# Patient Record
Sex: FEMALE | Race: WHITE | NOT HISPANIC OR LATINO
[De-identification: names, ages, dates, MRNs, and addresses within clinical notes are randomized per-mention and may not be internally consistent; named-entity substitution may affect disease eponyms.]

---

## 2017-06-12 ENCOUNTER — APPOINTMENT (OUTPATIENT)
Dept: ORTHOPEDIC SURGERY | Facility: CLINIC | Age: 57
End: 2017-06-12

## 2017-06-12 VITALS — BODY MASS INDEX: 37.89 KG/M2 | HEIGHT: 68 IN | WEIGHT: 250 LBS

## 2017-06-14 ENCOUNTER — MEDICATION RENEWAL (OUTPATIENT)
Age: 57
End: 2017-06-14

## 2017-07-21 ENCOUNTER — APPOINTMENT (OUTPATIENT)
Dept: NEUROLOGY | Facility: CLINIC | Age: 57
End: 2017-07-21

## 2017-07-21 VITALS
WEIGHT: 257 LBS | HEIGHT: 68 IN | HEART RATE: 150 BPM | SYSTOLIC BLOOD PRESSURE: 125 MMHG | BODY MASS INDEX: 38.95 KG/M2 | DIASTOLIC BLOOD PRESSURE: 88 MMHG

## 2017-07-21 DIAGNOSIS — M54.5 LOW BACK PAIN: ICD-10-CM

## 2017-07-21 RX ORDER — DICLOFENAC SODIUM 75 MG/1
75 TABLET, DELAYED RELEASE ORAL
Qty: 90 | Refills: 1 | Status: DISCONTINUED | COMMUNITY
Start: 2017-06-12 | End: 2017-07-21

## 2017-08-16 ENCOUNTER — RX RENEWAL (OUTPATIENT)
Age: 57
End: 2017-08-16

## 2017-08-16 RX ORDER — TIZANIDINE 4 MG/1
4 TABLET ORAL 3 TIMES DAILY
Qty: 90 | Refills: 1 | Status: ACTIVE | COMMUNITY
Start: 2017-06-15 | End: 1900-01-01

## 2017-10-27 ENCOUNTER — APPOINTMENT (OUTPATIENT)
Dept: NEUROLOGY | Facility: CLINIC | Age: 57
End: 2017-10-27

## 2019-08-23 ENCOUNTER — APPOINTMENT (OUTPATIENT)
Dept: ORTHOPEDIC SURGERY | Facility: CLINIC | Age: 59
End: 2019-08-23
Payer: COMMERCIAL

## 2019-08-23 DIAGNOSIS — M70.71 OTHER BURSITIS OF HIP, RIGHT HIP: ICD-10-CM

## 2019-08-23 PROCEDURE — 73522 X-RAY EXAM HIPS BI 3-4 VIEWS: CPT

## 2019-08-23 PROCEDURE — 99214 OFFICE O/P EST MOD 30 MIN: CPT | Mod: 25

## 2019-08-23 PROCEDURE — 20610 DRAIN/INJ JOINT/BURSA W/O US: CPT | Mod: RT

## 2019-08-23 NOTE — DISCUSSION/SUMMARY
[de-identified] : Following the injection the patient had excellent improvement and almost no pain over her greater trochanter she will take 2 Aleve twice a day and possibly after Zantac.  Return visit in 3 months as needed.

## 2019-08-23 NOTE — PROCEDURE
[de-identified] : Procedure Note: \par \par Anatomic Location:  right hip trochanteric bursitis\par \par Diagnosis:  hip trochanteric bursitis\par \par Procedure:  Injection of 6ccs of Marcaine 0.5% plain and Depo-Medrol 1cc (40 MG)\par \par Local Spray: Ethyl Chloride.\par \par Skin preparation with alcohol.\par \par Patient has consented for the procedure.\par \par Injection 22-gauge spinal needle  through an anterior  lateral approach.\par \par Patient tolerated the procedure well.\par \par Patient instructed to call the office if any reaction, fever, chills, increased erythema or swelling.   [phone].

## 2019-08-23 NOTE — PHYSICAL EXAM
[de-identified] :  constitutional - the patient is of normal build and morbid obesity.  BMI is 39.  The patient remains oriented to person, time, and  place.  Mood is normal. Vital signs as recorded.  The patients gait is WNL with pain over the lateral aspect of her right hip.. The patient has satisfactory  balance and can stand on toes and heels.\par \par The patient has no difficulty with respiration. Respiration at rest is a normal rate. The patient is not short of breath and has not become short of breath with short ambulation. There is no audible wheezing. No coughing.\par \par Skin is normal for the patient's age. There are no abnormal masses or lymph nodes which stand out in the lower extremities.\par \par Spine -as per Dr. Seth\par \par \par UPPER EXTREMITIES \par \par Shoulders ROM  is symmetric  and the motion is satisfactory.  There is no significant shoulder pain or limitation in motion which would make using a cane or a walker difficult. Shoulder stability and  strength are satisfactory.\par \par Circulation appears satisfactory with pedal pulses present.  There is no major edema in the lower legs. No skin tenderness or increased temperature. No major varicosities.\par \par HIP EXAMINATION the abduction and abduction as well as rotation measurements were taken with the hip in flexion.\par Hip motion is symmetric with flexion of 135, abduction of 80, adduction 40, external rotation 75 and internal rotation to 20.  She has full extension.  He has pain directly over her right greater trochanter.  Has had a lipoma removed around her right ileum proximal to the hip.\par \par \par The hips have good range of motion. There is good strength across the hips. There is no crepitus in either hip. The alignment of the hips is normal.\par \par \par KNEE EXAMINATION\par \par Motion\par Right Knee                 *[0-135]\par Left  Knee                  *[0-135]\par The Knees have very good motion. There is good medial lateral and anterior posterior stability. There is no crepitus. There is no effusion. There is good strength across the knees.\par \par Ankle and foot examination\par Of the ankle has normal motion.  There is normal ankle stability.  The patient has no major abnormalities of the foot.\par \par \par \par  [de-identified] : AP of the pelvis and lateral of the right and left hip shows both femoral heads are round joint spaces are present there are no major osteophytes seen.  She is noted to have a significant list of the lumbar spine going to the left but an old x-rays she is a curvature in the lumbar area of the convexity to the left.

## 2019-12-20 ENCOUNTER — APPOINTMENT (OUTPATIENT)
Dept: ORTHOPEDIC SURGERY | Facility: CLINIC | Age: 59
End: 2019-12-20

## 2020-03-20 RX ORDER — CELECOXIB 100 MG/1
100 CAPSULE ORAL TWICE DAILY
Qty: 60 | Refills: 1 | Status: ACTIVE | COMMUNITY
Start: 2020-03-20 | End: 1900-01-01

## 2020-03-27 ENCOUNTER — APPOINTMENT (OUTPATIENT)
Dept: ORTHOPEDIC SURGERY | Facility: CLINIC | Age: 60
End: 2020-03-27

## 2020-05-27 ENCOUNTER — APPOINTMENT (OUTPATIENT)
Dept: ORTHOPEDIC SURGERY | Facility: CLINIC | Age: 60
End: 2020-05-27
Payer: COMMERCIAL

## 2020-05-27 DIAGNOSIS — Z96.652 PRESENCE OF LEFT ARTIFICIAL KNEE JOINT: ICD-10-CM

## 2020-05-27 DIAGNOSIS — Z96.651 PRESENCE OF RIGHT ARTIFICIAL KNEE JOINT: ICD-10-CM

## 2020-05-27 PROCEDURE — 20610 DRAIN/INJ JOINT/BURSA W/O US: CPT | Mod: RT

## 2020-05-27 PROCEDURE — 99213 OFFICE O/P EST LOW 20 MIN: CPT | Mod: 25

## 2020-05-27 NOTE — PROCEDURE
[de-identified] : Procedure Note: \par \par Anatomic Location:  right hip trochanteric bursitis\par \par Diagnosis:  hip trochanteric bursitis\par \par Procedure:  Injection of 6ccs of Marcaine 0.5% plain and Depo-Medrol 1cc (40 MG)\par \par Local Spray: Ethyl Chloride.\par \par Skin preparation with alcohol.\par \par Patient has consented for the procedure.\par \par Injection 22-gauge spinal needle  through a direct lateral l approach.\par \par Patient tolerated the procedure well.\par \par \par Procedure Note: \par \par Anatomic Location: Left hip trochanteric bursitis\par \par Diagnosis:  hip trochanteric bursitis\par \par Procedure:  Injection of 6ccs of Marcaine 0.5% plain and Depo-Medrol 1cc (40 MG)\par \par Local Spray: Ethyl Chloride.\par \par Skin preparation with alcohol.\par \par Patient has consented for the procedure.\par \par Injection 22-gauge spinal needle  through an direct lateral approach.\par \par Patient tolerated the procedure well.\par \par Patient instructed to call the office if any reaction, fever, chills, increased erythema or swelling.   [phone].

## 2020-05-27 NOTE — PHYSICAL EXAM
[de-identified] : This patient does continue to be overweight she is doing generally well however it is having some pain over the greater trochanter on both the right and the left hips.\par \par HIP EXAMINATION the abduction and abduction as well as rotation measurements were taken with the hip in flexion.\par Hip motion is symmetric with flexion of 135, abduction of 80, adduction 40, external rotation 75 and internal rotation to 20.  She has full extension.  He has pain directly over her right greater trochanter.  Her tenderness is directly over the greater trochanters.\par \par \par There is no crepitus in either hip. The alignment of the hips is normal.\par \par \par KNEE EXAMINATION\par \par Motion\par Right Knee                 *[0-135]\par Left  Knee                  *[0-135]\par The Knees have very good motion. There is good medial lateral and anterior posterior stability. There is no crepitus. There is no effusion. There is good strength across the knees.\par \par Ankle and foot examination\par Of the ankle has normal motion.  There is normal ankle stability.  The patient has no major abnormalities of the foot.\par \par \par \par  [de-identified] : AP of the pelvis and lateral of the right and left hip shows both femoral heads are round joint spaces are present there are no major osteophytes seen.  She is noted to have a significant list of the lumbar spine going to the left but an old x-rays she is a curvature in the lumbar area of the convexity to the left.

## 2020-05-27 NOTE — HISTORY OF PRESENT ILLNESS
[de-identified] : Patient returns to office with bilateral hip pain.\par She was last in office in August 2019 and received a right GT bursa injection then, which helped greatly, but for only 1 month.\par She tried Ibuprofen, but doesn't note any improvement with this.

## 2020-06-26 ENCOUNTER — RESULT REVIEW (OUTPATIENT)
Age: 60
End: 2020-06-26

## 2020-08-25 ENCOUNTER — APPOINTMENT (OUTPATIENT)
Dept: ORTHOPEDIC SURGERY | Facility: CLINIC | Age: 60
End: 2020-08-25

## 2020-08-28 ENCOUNTER — APPOINTMENT (OUTPATIENT)
Dept: ORTHOPEDIC SURGERY | Facility: CLINIC | Age: 60
End: 2020-08-28
Payer: COMMERCIAL

## 2020-08-28 VITALS
SYSTOLIC BLOOD PRESSURE: 135 MMHG | HEIGHT: 68 IN | HEART RATE: 80 BPM | BODY MASS INDEX: 37.89 KG/M2 | DIASTOLIC BLOOD PRESSURE: 93 MMHG | WEIGHT: 250 LBS

## 2020-08-28 VITALS — TEMPERATURE: 97.3 F

## 2020-08-28 DIAGNOSIS — M54.16 RADICULOPATHY, LUMBAR REGION: ICD-10-CM

## 2020-08-28 DIAGNOSIS — M70.62 TROCHANTERIC BURSITIS, RIGHT HIP: ICD-10-CM

## 2020-08-28 DIAGNOSIS — M70.61 TROCHANTERIC BURSITIS, RIGHT HIP: ICD-10-CM

## 2020-08-28 PROCEDURE — 20610 DRAIN/INJ JOINT/BURSA W/O US: CPT | Mod: RT

## 2020-08-28 PROCEDURE — 99215 OFFICE O/P EST HI 40 MIN: CPT | Mod: 25

## 2020-08-28 NOTE — PHYSICAL EXAM
[de-identified] : AP of the pelvis and lateral of the right and left hip reviewed from 2019 shows both femoral heads are round joint spaces are present there are no major osteophytes seen.  \par \par AP and lateral views of the lumbar spine from 2015 show a significant list of the lumbar spine going to left along with degenerative disc spaces between the L2-3 and L3-4 vertebral bodies with anterolisthesis of L3 on L4. [de-identified] : This patient does continue to be overweight she is doing generally well however it is having some pain over the greater trochanter on both the right and the left hips.\par \par HIP EXAMINATION the abduction and abduction as well as rotation measurements were taken with the hip in flexion.\par Hip motion is symmetric with flexion of 135, abduction of 80, adduction 40, external rotation 75 and internal rotation to 20.  She has full extension. She has pain directly over her right greater trochanter.  Her tenderness is directly over the greater trochanters.\par \par There is no crepitus in either hip. The alignment of the hips is normal.\par \par KNEE EXAMINATION\par \par Motion\par Right Knee                 *[0-135]\par Left  Knee                  *[0-135]\par The Knees have very good motion. There is good medial lateral and anterior posterior stability. There is no crepitus. There is no effusion. There is good strength across the knees.\par \par Ankle and foot examination\par Of the ankle has normal motion.  There is normal ankle stability.  The patient has no major abnormalities of the foot.\par \par LUMBAR SPINE EXAMINATION:\par \par Inspection reveals no major deformities but she does appear to have levoscoliosis. Palpation produces pain to the left greater than right sacroiliac joints with pain also present over the L2, L3, and L4 vertebral bodies. Lumbar flexion, extension, lateral rotation, and lateral bending are within normal limits. 5/5 strength in bilateral lower extremities and normal sensation and neurovascular exams bilaterally.

## 2020-08-28 NOTE — DISCUSSION/SUMMARY
[de-identified] : She tolerated the local injections very well to both of her hips at the greater trochanters. She will return to office in 3 to 6 months for this.\par Due to her pain present today in her lumbar spine and likely radicular nature of her pain, an MRI of her lumbar spine was ordered for further evaluation.\par She will inform the office once her MRI is completed and we will discuss the results with her over the phone.\par Pending results of the MRI, she will likely be referred to spine orthopedics for further evaluation and possible referral to physiatry for epidural injections will be needed.\par All options discussed with patient.\par All questions by patient answered to their satisfaction.\par Patient expresses full understanding and agreement with plan.\par Patient is very happy with the office visit.\par

## 2020-08-28 NOTE — HISTORY OF PRESENT ILLNESS
[de-identified] : Patient returns to office with bilateral hip pain.\par She was last in office in May 2020 and received bilateral greater trochanteric bursa injections then, which helped greatly for about 2-3 months.\par She tried Ibuprofen, but doesn't note any improvement with this.\par She also notes some intermittent lower back pain which she says radiates into her hips and sometimes down her legs into her thighs.\par She denies bowel/bladder dysfunction or saddle anesthesia.

## 2020-08-28 NOTE — PROCEDURE
[de-identified] : Procedure Note: \par \par Anatomic Location:  right hip trochanteric bursitis\par \par Diagnosis:  hip trochanteric bursitis\par \par Procedure:  Injection of 6ccs of Marcaine 0.5% plain and Depo-Medrol 1cc (40 MG)\par \par Local Spray: Ethyl Chloride.\par \par Skin preparation with alcohol.\par \par Patient has consented for the procedure.\par \par Injection 22-gauge spinal needle  through a direct lateral l approach.\par \par Patient tolerated the procedure well.\par \par \par Procedure Note: \par \par Anatomic Location: Left hip trochanteric bursitis\par \par Diagnosis:  hip trochanteric bursitis\par \par Procedure:  Injection of 6ccs of Marcaine 0.5% plain and Depo-Medrol 1cc (40 MG)\par \par Local Spray: Ethyl Chloride.\par \par Skin preparation with alcohol.\par \par Patient has consented for the procedure.\par \par Injection 22-gauge spinal needle  through an direct lateral approach.\par \par Patient tolerated the procedure well.\par \par Patient instructed to call the office if any reaction, fever, chills, increased erythema or swelling.   [phone].

## 2020-10-04 ENCOUNTER — RESULT REVIEW (OUTPATIENT)
Age: 60
End: 2020-10-04

## 2020-10-04 ENCOUNTER — APPOINTMENT (OUTPATIENT)
Dept: MRI IMAGING | Facility: CLINIC | Age: 60
End: 2020-10-04
Payer: COMMERCIAL

## 2020-10-04 ENCOUNTER — OUTPATIENT (OUTPATIENT)
Dept: OUTPATIENT SERVICES | Facility: HOSPITAL | Age: 60
LOS: 1 days | End: 2020-10-04
Payer: COMMERCIAL

## 2020-10-04 DIAGNOSIS — M54.5 LOW BACK PAIN: ICD-10-CM

## 2020-10-04 DIAGNOSIS — M54.16 RADICULOPATHY, LUMBAR REGION: ICD-10-CM

## 2020-10-04 PROCEDURE — 72148 MRI LUMBAR SPINE W/O DYE: CPT | Mod: 26

## 2020-10-04 PROCEDURE — 72148 MRI LUMBAR SPINE W/O DYE: CPT

## 2020-10-21 ENCOUNTER — APPOINTMENT (OUTPATIENT)
Dept: ORTHOPEDIC SURGERY | Facility: CLINIC | Age: 60
End: 2020-10-21

## 2020-10-30 ENCOUNTER — INPATIENT (INPATIENT)
Facility: HOSPITAL | Age: 60
LOS: 0 days | Discharge: HOME | DRG: 310 | End: 2020-10-31
Attending: HOSPITALIST | Admitting: HOSPITALIST
Payer: COMMERCIAL

## 2020-10-30 VITALS
RESPIRATION RATE: 18 BRPM | DIASTOLIC BLOOD PRESSURE: 83 MMHG | TEMPERATURE: 98 F | HEART RATE: 115 BPM | OXYGEN SATURATION: 99 % | HEIGHT: 68 IN | SYSTOLIC BLOOD PRESSURE: 135 MMHG | WEIGHT: 250 LBS

## 2020-10-30 DIAGNOSIS — Z98.890 OTHER SPECIFIED POSTPROCEDURAL STATES: Chronic | ICD-10-CM

## 2020-10-30 DIAGNOSIS — M54.9 DORSALGIA, UNSPECIFIED: ICD-10-CM

## 2020-10-30 DIAGNOSIS — I48.91 UNSPECIFIED ATRIAL FIBRILLATION: ICD-10-CM

## 2020-10-30 DIAGNOSIS — Z29.9 ENCOUNTER FOR PROPHYLACTIC MEASURES, UNSPECIFIED: ICD-10-CM

## 2020-10-30 DIAGNOSIS — E03.9 HYPOTHYROIDISM, UNSPECIFIED: ICD-10-CM

## 2020-10-30 DIAGNOSIS — E66.01 MORBID (SEVERE) OBESITY DUE TO EXCESS CALORIES: ICD-10-CM

## 2020-10-30 LAB
ALBUMIN SERPL ELPH-MCNC: 4.7 G/DL — SIGNIFICANT CHANGE UP (ref 3.3–5)
ALP SERPL-CCNC: 77 U/L — SIGNIFICANT CHANGE UP (ref 40–120)
ALT FLD-CCNC: <5 U/L — LOW (ref 10–45)
ANION GAP SERPL CALC-SCNC: 15 MMOL/L — SIGNIFICANT CHANGE UP (ref 5–17)
APPEARANCE UR: CLEAR — SIGNIFICANT CHANGE UP
APTT BLD: 27.3 SEC — LOW (ref 27.5–35.5)
AST SERPL-CCNC: 49 U/L — HIGH (ref 10–40)
BASOPHILS # BLD AUTO: 0.07 K/UL — SIGNIFICANT CHANGE UP (ref 0–0.2)
BASOPHILS NFR BLD AUTO: 0.7 % — SIGNIFICANT CHANGE UP (ref 0–2)
BILIRUB SERPL-MCNC: 0.3 MG/DL — SIGNIFICANT CHANGE UP (ref 0.2–1.2)
BILIRUB UR-MCNC: NEGATIVE — SIGNIFICANT CHANGE UP
BUN SERPL-MCNC: 20 MG/DL — SIGNIFICANT CHANGE UP (ref 7–23)
CALCIUM SERPL-MCNC: 10.3 MG/DL — SIGNIFICANT CHANGE UP (ref 8.4–10.5)
CHLORIDE SERPL-SCNC: 104 MMOL/L — SIGNIFICANT CHANGE UP (ref 96–108)
CO2 SERPL-SCNC: 21 MMOL/L — LOW (ref 22–31)
COLOR SPEC: COLORLESS — SIGNIFICANT CHANGE UP
CREAT SERPL-MCNC: 0.81 MG/DL — SIGNIFICANT CHANGE UP (ref 0.5–1.3)
DIFF PNL FLD: NEGATIVE — SIGNIFICANT CHANGE UP
EOSINOPHIL # BLD AUTO: 0.34 K/UL — SIGNIFICANT CHANGE UP (ref 0–0.5)
EOSINOPHIL NFR BLD AUTO: 3.4 % — SIGNIFICANT CHANGE UP (ref 0–6)
GLUCOSE SERPL-MCNC: 100 MG/DL — HIGH (ref 70–99)
GLUCOSE UR QL: NEGATIVE — SIGNIFICANT CHANGE UP
HCT VFR BLD CALC: 43.1 % — SIGNIFICANT CHANGE UP (ref 34.5–45)
HGB BLD-MCNC: 14.3 G/DL — SIGNIFICANT CHANGE UP (ref 11.5–15.5)
IMM GRANULOCYTES NFR BLD AUTO: 0.3 % — SIGNIFICANT CHANGE UP (ref 0–1.5)
INR BLD: 0.87 RATIO — LOW (ref 0.88–1.16)
KETONES UR-MCNC: NEGATIVE — SIGNIFICANT CHANGE UP
LEUKOCYTE ESTERASE UR-ACNC: NEGATIVE — SIGNIFICANT CHANGE UP
LYMPHOCYTES # BLD AUTO: 2.97 K/UL — SIGNIFICANT CHANGE UP (ref 1–3.3)
LYMPHOCYTES # BLD AUTO: 29.9 % — SIGNIFICANT CHANGE UP (ref 13–44)
MAGNESIUM SERPL-MCNC: 2.3 MG/DL — SIGNIFICANT CHANGE UP (ref 1.6–2.6)
MCHC RBC-ENTMCNC: 31.2 PG — SIGNIFICANT CHANGE UP (ref 27–34)
MCHC RBC-ENTMCNC: 33.2 GM/DL — SIGNIFICANT CHANGE UP (ref 32–36)
MCV RBC AUTO: 93.9 FL — SIGNIFICANT CHANGE UP (ref 80–100)
MONOCYTES # BLD AUTO: 0.58 K/UL — SIGNIFICANT CHANGE UP (ref 0–0.9)
MONOCYTES NFR BLD AUTO: 5.8 % — SIGNIFICANT CHANGE UP (ref 2–14)
NEUTROPHILS # BLD AUTO: 5.94 K/UL — SIGNIFICANT CHANGE UP (ref 1.8–7.4)
NEUTROPHILS NFR BLD AUTO: 59.9 % — SIGNIFICANT CHANGE UP (ref 43–77)
NITRITE UR-MCNC: NEGATIVE — SIGNIFICANT CHANGE UP
NRBC # BLD: 0 /100 WBCS — SIGNIFICANT CHANGE UP (ref 0–0)
PH UR: 6.5 — SIGNIFICANT CHANGE UP (ref 5–8)
PHOSPHATE SERPL-MCNC: 3 MG/DL — SIGNIFICANT CHANGE UP (ref 2.5–4.5)
PLATELET # BLD AUTO: 282 K/UL — SIGNIFICANT CHANGE UP (ref 150–400)
POTASSIUM SERPL-MCNC: 5.1 MMOL/L — SIGNIFICANT CHANGE UP (ref 3.5–5.3)
POTASSIUM SERPL-SCNC: 5.1 MMOL/L — SIGNIFICANT CHANGE UP (ref 3.5–5.3)
PROT SERPL-MCNC: 8.7 G/DL — HIGH (ref 6–8.3)
PROT UR-MCNC: NEGATIVE — SIGNIFICANT CHANGE UP
PROTHROM AB SERPL-ACNC: 10.5 SEC — LOW (ref 10.6–13.6)
RBC # BLD: 4.59 M/UL — SIGNIFICANT CHANGE UP (ref 3.8–5.2)
RBC # FLD: 13.5 % — SIGNIFICANT CHANGE UP (ref 10.3–14.5)
SARS-COV-2 RNA SPEC QL NAA+PROBE: SIGNIFICANT CHANGE UP
SODIUM SERPL-SCNC: 140 MMOL/L — SIGNIFICANT CHANGE UP (ref 135–145)
SP GR SPEC: 1 — LOW (ref 1.01–1.02)
TROPONIN T, HIGH SENSITIVITY RESULT: 7 NG/L — SIGNIFICANT CHANGE UP (ref 0–51)
TSH SERPL-MCNC: 5.36 UIU/ML — HIGH (ref 0.27–4.2)
UROBILINOGEN FLD QL: NEGATIVE — SIGNIFICANT CHANGE UP
WBC # BLD: 9.93 K/UL — SIGNIFICANT CHANGE UP (ref 3.8–10.5)
WBC # FLD AUTO: 9.93 K/UL — SIGNIFICANT CHANGE UP (ref 3.8–10.5)

## 2020-10-30 PROCEDURE — 71045 X-RAY EXAM CHEST 1 VIEW: CPT | Mod: 26

## 2020-10-30 PROCEDURE — 99285 EMERGENCY DEPT VISIT HI MDM: CPT

## 2020-10-30 PROCEDURE — 99223 1ST HOSP IP/OBS HIGH 75: CPT

## 2020-10-30 PROCEDURE — 93010 ELECTROCARDIOGRAM REPORT: CPT

## 2020-10-30 RX ORDER — CHOLECALCIFEROL (VITAMIN D3) 125 MCG
0 CAPSULE ORAL
Qty: 0 | Refills: 0 | DISCHARGE

## 2020-10-30 RX ORDER — PREGABALIN 225 MG/1
0 CAPSULE ORAL
Qty: 0 | Refills: 0 | DISCHARGE

## 2020-10-30 RX ORDER — GABAPENTIN 400 MG/1
300 CAPSULE ORAL THREE TIMES A DAY
Refills: 0 | Status: DISCONTINUED | OUTPATIENT
Start: 2020-10-30 | End: 2020-10-31

## 2020-10-30 RX ORDER — METOPROLOL TARTRATE 50 MG
5 TABLET ORAL ONCE
Refills: 0 | Status: COMPLETED | OUTPATIENT
Start: 2020-10-30 | End: 2020-10-30

## 2020-10-30 RX ORDER — METOPROLOL TARTRATE 50 MG
12.5 TABLET ORAL EVERY 8 HOURS
Refills: 0 | Status: DISCONTINUED | OUTPATIENT
Start: 2020-10-30 | End: 2020-10-31

## 2020-10-30 RX ORDER — ENOXAPARIN SODIUM 100 MG/ML
40 INJECTION SUBCUTANEOUS DAILY
Refills: 0 | Status: DISCONTINUED | OUTPATIENT
Start: 2020-10-30 | End: 2020-10-31

## 2020-10-30 RX ORDER — INFLUENZA VIRUS VACCINE 15; 15; 15; 15 UG/.5ML; UG/.5ML; UG/.5ML; UG/.5ML
0.5 SUSPENSION INTRAMUSCULAR ONCE
Refills: 0 | Status: DISCONTINUED | OUTPATIENT
Start: 2020-10-30 | End: 2020-10-31

## 2020-10-30 RX ORDER — PANTOPRAZOLE SODIUM 20 MG/1
40 TABLET, DELAYED RELEASE ORAL
Refills: 0 | Status: DISCONTINUED | OUTPATIENT
Start: 2020-10-30 | End: 2020-10-31

## 2020-10-30 RX ORDER — METOPROLOL TARTRATE 50 MG
12.5 TABLET ORAL ONCE
Refills: 0 | Status: COMPLETED | OUTPATIENT
Start: 2020-10-30 | End: 2020-10-30

## 2020-10-30 RX ORDER — GABAPENTIN 400 MG/1
1 CAPSULE ORAL
Qty: 0 | Refills: 0 | DISCHARGE

## 2020-10-30 RX ADMIN — ENOXAPARIN SODIUM 40 MILLIGRAM(S): 100 INJECTION SUBCUTANEOUS at 11:17

## 2020-10-30 RX ADMIN — Medication 1 TABLET(S): at 22:31

## 2020-10-30 RX ADMIN — GABAPENTIN 300 MILLIGRAM(S): 400 CAPSULE ORAL at 22:31

## 2020-10-30 RX ADMIN — Medication 12.5 MILLIGRAM(S): at 14:48

## 2020-10-30 RX ADMIN — Medication 5 MILLIGRAM(S): at 05:30

## 2020-10-30 RX ADMIN — Medication 5 MILLIGRAM(S): at 05:01

## 2020-10-30 RX ADMIN — GABAPENTIN 300 MILLIGRAM(S): 400 CAPSULE ORAL at 14:48

## 2020-10-30 RX ADMIN — Medication 12.5 MILLIGRAM(S): at 06:35

## 2020-10-30 RX ADMIN — Medication 12.5 MILLIGRAM(S): at 22:31

## 2020-10-30 NOTE — ED ADULT NURSE NOTE - OBJECTIVE STATEMENT
Patient is a 60 year old female coming to the ED via private car complaining of palpitations. A&Ox4 speaking in full sentences. Patient states she was having a tough day Patient is a 60 year old female coming to the ED via private car complaining of palpitations. A&Ox4 speaking in full sentences. Patient states she was having a "tough day" and having a lot of anxiety yesterday. Patient stated she felt like she wasn't going to be able to sleep last night because of her chronic back pain so she took her prescribed gabapentin. Patient reports she slept for about 45 minutes when she was woken up by her heart beating fast and hard. Patient states this has never woken her up from sleep in the past, but she has felt this kind of beating before because of her anxiety. Upon auscultation patient's heart beat was irregular, S1 and S2 heard. No edema or jugular vein distention noted. Bilateral lung sounds clear. Patient denies chest pain, shortness of breath, headache, N/V/D, urinary frequency/burning.

## 2020-10-30 NOTE — ED PROVIDER NOTE - NS ED MD EKG INTERPRETATION 1
NSR/normal sinus rhythm, Normal axis, Normal HI interval and QRS complex. There are no acute ischemic ST or T-wave changes.

## 2020-10-30 NOTE — ED PROVIDER NOTE - CLINICAL SUMMARY MEDICAL DECISION MAKING FREE TEXT BOX
59yo F Hx low back pain and anxiety presenting with complaints of palpitations. found to be in rapid a fib. no recent illness, Hx DVTs, cardiac Hx. will obtain labs, tsh, cxr, ua and treat with metoprolol. admit to tele for w/u.

## 2020-10-30 NOTE — H&P ADULT - NSHPPHYSICALEXAM_GEN_ALL_CORE
T(C): 36.9 (10-30-20 @ 08:28), Max: 37 (10-30-20 @ 04:30)  T(F): 98.5 (10-30-20 @ 08:28), Max: 98.6 (10-30-20 @ 04:30)  HR: 62 (10-30-20 @ 08:28) (62 - 150)  BP: 122/77 (10-30-20 @ 08:28) (117/67 - 135/83)  RR: 18 (10-30-20 @ 08:28) (18 - 20)  SpO2: 100% (10-30-20 @ 08:28) (97% - 100%)  Wt(kg): --

## 2020-10-30 NOTE — H&P ADULT - NSHPREVIEWOFSYSTEMS_GEN_ALL_CORE
CONSTITUTIONAL: No weakness, fevers or chills, no weight loss or weight gain  EYES: No visual changes;  no blurry vision  ENT: No vertigo or throat pain, no difficulty swallowing  NECK: No pain or stiffness  RESPIRATORY: No cough, wheezing, hemoptysis; No shortness of breath  CARDIOVASCULAR: No chest pain, +palpitations, no prthopnea, no PND, no leg swelling  GASTROINTESTINAL: No abdominal or epigastric pain. No nausea, vomiting, or hematemesis; No diarrhea or constipation. No melena or hematochezia.  GENITOURINARY: No dysuria, frequency or hematuria  NEUROLOGICAL: No numbness or weakness, no syncope  SKIN: No itching, rashes  ENDOCRINE: No polydipsia, no heat or cold intolerance  IMMUNOLOGY: No gum bleeding, no lymphadenopathy  PSYCH: No insomnia, no depression  RHEUMATOLOGY: No joint swelling, no rash

## 2020-10-30 NOTE — ED PROVIDER NOTE - OBJECTIVE STATEMENT
59yo F Hx low back pain 61yo F Hx low back pain on gabapentin and anxiety presenting with complaints of palpitations. had a cup of coffee in the afternoon and went to sleep, woke approximately 1.5hrs prior to arrival with a fluttering sensation in her heart. states that it has been constant since. has had this before 61yo F Hx low back pain on gabapentin and anxiety presenting with complaints of palpitations. had a cup of coffee in the afternoon and went to sleep, woke approximately 1.5hrs prior to arrival with a fluttering sensation in her heart. Felt slightly lightheaded but no SOB, CP, n/v. denies f/c, cough, congestion, urinary symptoms. states that it has been constant since. she has had similar symptoms before, seen [place] [facility] and given a medication to calm her down. followed with a cardiologist, Dr. Mandel, had a full workup including monitors and was told that these episodes were anxiety related. last saw him 2 years ago.

## 2020-10-30 NOTE — ED PROVIDER NOTE - ATTENDING CONTRIBUTION TO CARE
MD Danielson:  patient seen and evaluated personally.   I agree with the History & Physical,  Impression & Plan other than what was detailed in my note.  MD Danielson  59 y/o f borderline high cholesterol, no other sig med hx, presents to ed w/ cc of palpitations, started at 130 am. States she has had palpitations like this in the past and has had a negative workup. Pt has no associated cp, sob, lightheadedness, n/v, sweating, no hx of thyroid problems/neck swelling, feeling hot/cold, also no hx of dvt/pe, no recent travels. no recent decongestants. afebrile, hr 170, bp 120's, well appearing, non toxic, lungs cta b/l ,heart irregularly irregular, no mrg, abd soft nt, neg pham rosa. ekg shows afib. no clear reason for pt to have afib. No s/s consistent w/ pe to warrant d dimer. will check cbc, cmp, mag, tsh, cxr. metorpolol iv 5mg x 3 for hr, target of rate <110.  Likely admit

## 2020-10-30 NOTE — ED ADULT NURSE REASSESSMENT NOTE - NS ED NURSE REASSESS COMMENT FT1
Patient was in A-Fib tachycardic with a heart rate of 180. Metoprolol 10 mg given IV push. Patient now in normal sinus rhythm heart rate of 74. Patient stated she "feels a lot better." Patient denies any pain at this time. Safety and comfort measures maintained. Patient was in A-Fib tachycardic with a heart rate of 180. Metoprolol 10 mg given IV push. Patient now in normal sinus rhythm heart rate of 74. Repeat EKG done. Patient stated she "feels a lot better." Patient denies any pain at this time. Safety and comfort measures maintained. Patient was in A-Fib tachycardia with a heart rate of 180 bpm. Metoprolol 5 mg x2 given IV push. Patient now in normal sinus rhythm heart rate of 74 bpm. Repeat EKG done showing NSR with rate of 67 bpm. Patient stated she "feels a lot better." Patient denies any pain at this time. Safety and comfort measures maintained. ECG performed at bedside showing patient in A-Fib, tachycardic with a heart rate of 180 bpm. Patient placed on cardiac monitor. Metoprolol 5 mg x2 given IV push. Patient now in normal sinus rhythm heart rate of 74 bpm. Repeat EKG done showing NSR with rate of 67 bpm. Patient stated she "feels a lot better." Patient denies any pain at this time. Safety and comfort measures maintained.

## 2020-10-30 NOTE — H&P ADULT - HISTORY OF PRESENT ILLNESS
60F with no significant PMH who presents with palpitations X 1 day. Pt states that symptoms started while she was sleeping, midsternal, without associated chest pain or pressure, SOB, numbness or tingling in extremities or jaw. She reports a prior episode 5 years ago which was worked up with no abnormalities found, and had a monitor on for 2 days which despite symptoms, showed nothing. ROS otherwise negative.   In ER, found to be in TRVR to 150s, confirmed by EKG, and received metoprolol IV X 2 with resolution of symptoms. Admitted for further workup of new onset AFib.

## 2020-10-30 NOTE — H&P ADULT - PROBLEM SELECTOR PLAN 1
S/p metoprolol IV X 2, now in sinus rhythm  EKG reviewed: AFib with RVR on admission, now NSR with no ST-T abnormalities  CHADsVasc 1 for gender  Cont metoprolol 12.5mg Q8H for now  F/u ECHO  Monitor on tele X 24 hrs  start ASA 81mg daily

## 2020-10-30 NOTE — ED ADULT NURSE REASSESSMENT NOTE - NS ED NURSE REASSESS COMMENT FT1
Patient received from previous RN alert and oriented x4, resting in bed comfortably, vital signs stable, denies chest pain, shortness of breath and all other complaints. Respirations even and nonlabored, breathing spontaneously on room air. Pending further orders. Azar RN

## 2020-10-30 NOTE — ED PROVIDER NOTE - PROGRESS NOTE DETAILS
jorge mancia pgy2: pt converted to NSR s/p 2nd dose of 5mg metoprolol. will give 12.5mg PO, and admit following labs, cxr and ua results.

## 2020-10-31 ENCOUNTER — TRANSCRIPTION ENCOUNTER (OUTPATIENT)
Age: 60
End: 2020-10-31

## 2020-10-31 VITALS
RESPIRATION RATE: 18 BRPM | HEART RATE: 69 BPM | SYSTOLIC BLOOD PRESSURE: 138 MMHG | OXYGEN SATURATION: 99 % | DIASTOLIC BLOOD PRESSURE: 89 MMHG | TEMPERATURE: 98 F

## 2020-10-31 LAB
A1C WITH ESTIMATED AVERAGE GLUCOSE RESULT: 5.2 % — SIGNIFICANT CHANGE UP (ref 4–5.6)
ANION GAP SERPL CALC-SCNC: 10 MMOL/L — SIGNIFICANT CHANGE UP (ref 5–17)
BUN SERPL-MCNC: 14 MG/DL — SIGNIFICANT CHANGE UP (ref 7–23)
CALCIUM SERPL-MCNC: 9.3 MG/DL — SIGNIFICANT CHANGE UP (ref 8.4–10.5)
CHLORIDE SERPL-SCNC: 108 MMOL/L — SIGNIFICANT CHANGE UP (ref 96–108)
CHOLEST SERPL-MCNC: 206 MG/DL — HIGH
CO2 SERPL-SCNC: 24 MMOL/L — SIGNIFICANT CHANGE UP (ref 22–31)
CREAT SERPL-MCNC: 0.78 MG/DL — SIGNIFICANT CHANGE UP (ref 0.5–1.3)
ESTIMATED AVERAGE GLUCOSE: 103 MG/DL — SIGNIFICANT CHANGE UP (ref 68–114)
GLUCOSE SERPL-MCNC: 88 MG/DL — SIGNIFICANT CHANGE UP (ref 70–99)
HCT VFR BLD CALC: 39.6 % — SIGNIFICANT CHANGE UP (ref 34.5–45)
HCV AB S/CO SERPL IA: 0.09 S/CO — SIGNIFICANT CHANGE UP (ref 0–0.99)
HCV AB SERPL-IMP: SIGNIFICANT CHANGE UP
HDLC SERPL-MCNC: 45 MG/DL — LOW
HGB BLD-MCNC: 12.8 G/DL — SIGNIFICANT CHANGE UP (ref 11.5–15.5)
LIPID PNL WITH DIRECT LDL SERPL: 134 MG/DL — HIGH
MCHC RBC-ENTMCNC: 31.1 PG — SIGNIFICANT CHANGE UP (ref 27–34)
MCHC RBC-ENTMCNC: 32.3 GM/DL — SIGNIFICANT CHANGE UP (ref 32–36)
MCV RBC AUTO: 96.4 FL — SIGNIFICANT CHANGE UP (ref 80–100)
NON HDL CHOLESTEROL: 160 MG/DL — HIGH
NRBC # BLD: 0 /100 WBCS — SIGNIFICANT CHANGE UP (ref 0–0)
PLATELET # BLD AUTO: 262 K/UL — SIGNIFICANT CHANGE UP (ref 150–400)
POTASSIUM SERPL-MCNC: 4.1 MMOL/L — SIGNIFICANT CHANGE UP (ref 3.5–5.3)
POTASSIUM SERPL-SCNC: 4.1 MMOL/L — SIGNIFICANT CHANGE UP (ref 3.5–5.3)
RBC # BLD: 4.11 M/UL — SIGNIFICANT CHANGE UP (ref 3.8–5.2)
RBC # FLD: 13.6 % — SIGNIFICANT CHANGE UP (ref 10.3–14.5)
SODIUM SERPL-SCNC: 142 MMOL/L — SIGNIFICANT CHANGE UP (ref 135–145)
T3 SERPL-MCNC: 99 NG/DL — SIGNIFICANT CHANGE UP (ref 80–200)
T4 AB SER-ACNC: 8.1 UG/DL — SIGNIFICANT CHANGE UP (ref 4.6–12)
TRIGL SERPL-MCNC: 134 MG/DL — SIGNIFICANT CHANGE UP
TSH SERPL-MCNC: 2.5 UIU/ML — SIGNIFICANT CHANGE UP (ref 0.27–4.2)
WBC # BLD: 6.68 K/UL — SIGNIFICANT CHANGE UP (ref 3.8–10.5)
WBC # FLD AUTO: 6.68 K/UL — SIGNIFICANT CHANGE UP (ref 3.8–10.5)

## 2020-10-31 PROCEDURE — 85027 COMPLETE CBC AUTOMATED: CPT

## 2020-10-31 PROCEDURE — 99285 EMERGENCY DEPT VISIT HI MDM: CPT | Mod: 25

## 2020-10-31 PROCEDURE — U0003: CPT

## 2020-10-31 PROCEDURE — 83735 ASSAY OF MAGNESIUM: CPT

## 2020-10-31 PROCEDURE — 85025 COMPLETE CBC W/AUTO DIFF WBC: CPT

## 2020-10-31 PROCEDURE — 85610 PROTHROMBIN TIME: CPT

## 2020-10-31 PROCEDURE — 86803 HEPATITIS C AB TEST: CPT

## 2020-10-31 PROCEDURE — 93005 ELECTROCARDIOGRAM TRACING: CPT

## 2020-10-31 PROCEDURE — 80048 BASIC METABOLIC PNL TOTAL CA: CPT

## 2020-10-31 PROCEDURE — 86769 SARS-COV-2 COVID-19 ANTIBODY: CPT

## 2020-10-31 PROCEDURE — 71045 X-RAY EXAM CHEST 1 VIEW: CPT

## 2020-10-31 PROCEDURE — 96374 THER/PROPH/DIAG INJ IV PUSH: CPT

## 2020-10-31 PROCEDURE — 80053 COMPREHEN METABOLIC PANEL: CPT

## 2020-10-31 PROCEDURE — 83036 HEMOGLOBIN GLYCOSYLATED A1C: CPT

## 2020-10-31 PROCEDURE — 85730 THROMBOPLASTIN TIME PARTIAL: CPT

## 2020-10-31 PROCEDURE — 84100 ASSAY OF PHOSPHORUS: CPT

## 2020-10-31 PROCEDURE — 84443 ASSAY THYROID STIM HORMONE: CPT

## 2020-10-31 PROCEDURE — 84480 ASSAY TRIIODOTHYRONINE (T3): CPT

## 2020-10-31 PROCEDURE — 84484 ASSAY OF TROPONIN QUANT: CPT

## 2020-10-31 PROCEDURE — 99239 HOSP IP/OBS DSCHRG MGMT >30: CPT

## 2020-10-31 PROCEDURE — 80061 LIPID PANEL: CPT

## 2020-10-31 PROCEDURE — 84436 ASSAY OF TOTAL THYROXINE: CPT

## 2020-10-31 PROCEDURE — 81003 URINALYSIS AUTO W/O SCOPE: CPT

## 2020-10-31 RX ORDER — METOPROLOL TARTRATE 50 MG
1 TABLET ORAL
Qty: 30 | Refills: 0
Start: 2020-10-31 | End: 2020-11-29

## 2020-10-31 RX ORDER — ATORVASTATIN CALCIUM 80 MG/1
1 TABLET, FILM COATED ORAL
Qty: 30 | Refills: 0
Start: 2020-10-31 | End: 2020-11-29

## 2020-10-31 RX ORDER — ASPIRIN/CALCIUM CARB/MAGNESIUM 324 MG
1 TABLET ORAL
Qty: 30 | Refills: 0
Start: 2020-10-31 | End: 2020-11-29

## 2020-10-31 RX ADMIN — GABAPENTIN 300 MILLIGRAM(S): 400 CAPSULE ORAL at 06:36

## 2020-10-31 RX ADMIN — ENOXAPARIN SODIUM 40 MILLIGRAM(S): 100 INJECTION SUBCUTANEOUS at 11:19

## 2020-10-31 RX ADMIN — PANTOPRAZOLE SODIUM 40 MILLIGRAM(S): 20 TABLET, DELAYED RELEASE ORAL at 06:36

## 2020-10-31 NOTE — DISCHARGE NOTE PROVIDER - NSDCCPCAREPLAN_GEN_ALL_CORE_FT
PRINCIPAL DISCHARGE DIAGNOSIS  Diagnosis: New onset atrial fibrillation  Assessment and Plan of Treatment: Atrial fibrillation is the most common heart rhythm problem.  The condition puts you at risk for has stroke and heart attack  It helps if you control your blood pressure, not drink more than 1-2 alcohol drinks per day, cut down on caffeine, getting treatment for over active thyroid gland, and get regular exercise  Call your doctor if you feel your heart racing or beating unusually, chest tightness or pain, lightheaded, faint, shortness of breath especially with exercise  It is important to take your heart medication as prescribed  You may be on anticoagulation which is very important to take as directed - you may need blood work to monitor drug levels

## 2020-10-31 NOTE — DISCHARGE NOTE PROVIDER - NSDCMRMEDTOKEN_GEN_ALL_CORE_FT
gabapentin 300 mg oral capsule: 1 cap(s) orally 3 times a day  multivitamin: 1 tab(s) orally once a day  Vitamin B-12:   Vitamin D3:    aspirin 81 mg oral tablet, disintegratin tab(s) orally once a day   gabapentin 300 mg oral capsule: 1 cap(s) orally 3 times a day  Lipitor 10 mg oral tablet: 1 tab(s) orally once a day   Metoprolol Succinate ER 25 mg oral tablet, extended release: 1 tab(s) orally once a day   multivitamin: 1 tab(s) orally once a day  Vitamin B-12:   Vitamin D3:

## 2020-10-31 NOTE — PROGRESS NOTE ADULT - ASSESSMENT
60F with no significant PMH who presents with palpitations X 1 day, found to be in new onset AFib with RVR     Problem/Plan - 1:  ·  Problem: New onset atrial fibrillation.  Plan: S/p metoprolol IV X 2, now in sinus rhythm  EKG reviewed: AFib with RVR on admission, now NSR with no ST-T abnormalities  CHADsVasc 1 for gender  Switch to toprol XL 25mg daily on discharge  ECHO as outpatient  Monitor on tele X 24 hrs  cont ASA 81mg daily.   Outpatient cardiology followup     Problem/Plan - 2:  ·  Problem: Chronic back pain.  Plan: stable; cont home gabapentin 300mg TID.      Problem/Plan - 3:  ·  Problem: Hypothyroidism, unspecified type.  Plan: No prior diagnosis of thyroid abnormalities  TSH elevated; repeat TFTs within normal limits.     Problem/Plan - 4:  ·  Problem: Morbid obesity.  Plan: Low fat/DASH diet.      Problem/Plan - 5:  ·  Problem: Hypercholesterolemia.  Plan: start lipitor 10mg daily  -outpatient PMD follow up     Problem/Plan - 6:  ·  Problem: DVT prophylaxis.  Plan: Lovenox for now.

## 2020-10-31 NOTE — DISCHARGE NOTE NURSING/CASE MANAGEMENT/SOCIAL WORK - PATIENT PORTAL LINK FT
You can access the FollowMyHealth Patient Portal offered by [facility] by registering at the following website: [email or web address]. By joining [facility]’s FollowMyHealth portal, you will also be able to view your health information using other applications (apps) compatible with our system.

## 2020-10-31 NOTE — DISCHARGE NOTE PROVIDER - NSFOLLOWUPCLINICS_GEN_ALL_ED_FT
[facility]  Cardiology  [address] [place] [id number]  Phone: [phone]  Fax:   Follow Up Time:     [facility]  General Internal Medicine  [address] [place] [id number]  Phone: [phone]  Fax:   Follow Up Time:

## 2020-10-31 NOTE — DISCHARGE NOTE PROVIDER - NSDCFUADDAPPT_GEN_ALL_CORE_FT
You have been provided with the clinic information. Please call and schedule follow up appointments in 4 to 7 days.

## 2020-10-31 NOTE — DISCHARGE NOTE PROVIDER - HOSPITAL COURSE
60F with no significant PMH who presents with palpitations X 1 day, found to be in new onset AFib 60F with no significant PMH who presents with palpitations X 1 day, found to be in new onset AFib. Rates have been well controlled. Pt. to have echocardiogram as outpatient

## 2020-10-31 NOTE — PROGRESS NOTE ADULT - ATTENDING COMMENTS
Pt called with c/o sob and stated she's having a COPD exacerabation. Stated the sob has been going on for about 3 days and she's having 3-4 bad coughing spells a day with the production of white thick sputum. She has been using her nebulizer and it has not really helped. Pt given an appt to be seen today by Ambar Andrade NP at 9:30 this a.m.
Stable for discharge to home with outpatient followup      >35 minutes spent on discharge planning

## 2020-11-01 LAB
SARS-COV-2 IGG SERPL QL IA: NEGATIVE — SIGNIFICANT CHANGE UP
SARS-COV-2 IGM SERPL IA-ACNC: <0.1 INDEX — SIGNIFICANT CHANGE UP

## 2021-09-02 NOTE — PROGRESS NOTE ADULT - NSHPATTENDINGPLANDISCUSS_GEN_ALL_CORE
PROGRESS NOTE      [place] Patient Status:  Inpatient    1971 MRN [id number]   Location [address] SURGICAL UNIT Attending Daniela Rouse MD   Hosp Day # 10 PCP Dax Lopez MD       Subjective  Discharged yesterday.  RN and CM unable to reach his mother.  No answer when I called either  Awake  States a friend will pick him up and he has keys to the house    Objective      Intake/Output Summary (Last 24 hours) at 2021 1044  Last data filed at 2021 0500  Gross per 24 hour   Intake 680 ml   Output 2300 ml   Net -1620 ml        Last Recorded Vitals  Blood pressure 92/59, pulse 78, temperature 98.1 °F (36.7 °C), temperature source Oral, resp. rate 16, height 5' 7.2\" (1.707 m), weight 60.4 kg (133 lb 2.5 oz), SpO2 99 %.  Body mass index is 20.73 kg/m².    Physical Exam  Vitals reviewed.   Constitutional:       Appearance: He is ill-appearing.      Comments: Chronically ill appearing   HENT:      Head: Normocephalic and atraumatic.   Eyes:      Pupils: Pupils are equal, round, and reactive to light.   Neck:      Trachea: No tracheal deviation.   Cardiovascular:      Rate and Rhythm: Normal rate and regular rhythm.   Pulmonary:      Effort: Pulmonary effort is normal. No respiratory distress.      Breath sounds: Normal breath sounds. No stridor. No wheezing or rales.   Chest:      Chest wall: No tenderness.   Abdominal:      General: There is no distension.      Palpations: Abdomen is soft.      Tenderness: There is no abdominal tenderness. There is no guarding or rebound.   Musculoskeletal:         General: No tenderness or deformity. Normal range of motion.      Cervical back: Neck supple.   Skin:     General: Skin is warm and dry.      Findings: No erythema or rash.   Neurological:      Mental Status: He is alert.   Psychiatric:         Mood and Affect: Mood and affect normal.         Cognition and Memory: Memory normal.         Judgment: Judgment normal.          Current  Facility-Administered Medications   Medication Dose Route Frequency Provider Last Rate Last Admin   • diazePAM (VALIUM) tablet 5 mg  5 mg Oral Daily Daniela Rouse MD   5 mg at 09/01/21 0911   • tolvaptan (SAMSCA) tablet 7.5 mg  7.5 mg Oral Daily Wadah Atassi, MD   7.5 mg at 09/02/21 0930   • magnesium oxide (MAG-OX) tablet 800 mg  800 mg Oral Daily Wadah Atassi, MD   800 mg at 09/02/21 0930   • heparin (porcine) injection 5,000 Units  5,000 Units Subcutaneous 3 times per day Darwin Chatterjee MD   5,000 Units at 09/02/21 0527   • haloperidol lactate (HALDOL) 5 MG/ML injection 2 mg  2 mg Intravenous Q4H PRN Prashant Ash MD   2 mg at 08/26/21 1032   • pantoprazole (PROTONIX) EC tablet 40 mg  40 mg Oral 2 times per day Prashant Ash MD   40 mg at 09/02/21 0929   • thiamine (VITAMIN B1) tablet 100 mg  100 mg Oral Daily Prashant Ash MD   100 mg at 09/02/21 0929   • folic acid (FOLATE) tablet 1 mg  1 mg Oral Daily Prashant Ash MD   1 mg at 09/02/21 0929   • amLODIPine (NORVASC) tablet 5 mg  5 mg Oral Daily Prashant Ash MD   5 mg at 09/01/21 0911   • sodium chloride 0.9 % flush bag 25 mL  25 mL Intravenous PRN Dakota Perrin MD       • sodium chloride (PF) 0.9 % injection 2 mL  2 mL Intracatheter 2 times per day Dakota Perrin MD   2 mL at 09/01/21 2100   • Potassium Standard Replacement Protocol   Does not apply See Admin Instructions Dakota Perrin MD       • Magnesium Standard Replacement Protocol   Does not apply See Admin Instructions Dakota Perrin MD       • ondansetron (ZOFRAN) injection 4 mg  4 mg Intravenous BID PRN Dakota Perrin MD   4 mg at 08/25/21 1012   • prochlorperazine (COMPAZINE) injection 5 mg  5 mg Intravenous Q4H PRN Dakota Perrin MD       • acetaminophen (TYLENOL) tablet 650 mg  650 mg Oral Q4H PRN Dakota Perrin MD   650 mg at 08/24/21 0806   • HYDROcodone-acetaminophen (NORCO) 5-325 MG per tablet 1 tablet  1 tablet Oral Q4H PRN Dakota Perrin MD       • docusate  sodium-sennosides (SENOKOT S) 50-8.6 MG 2 tablet  2 tablet Oral Daily PRN Dakota Perrin MD       • aluminum-magnesium hydroxide-simethicone (MAALOX) 200-200-20 MG/5ML suspension 20 mL  20 mL Oral Q4H PRN Dakota Perrin MD       • sodium chloride 0.9 % flush bag 25 mL  25 mL Intravenous PRN Dakota Perrin MD       • LORazepam (ATIVAN) injection 2 mg  2 mg Intravenous Q1H PRN Dakota Perrin MD   2 mg at 08/27/21 1217    Or   • LORazepam (ATIVAN) injection 2 mg  2 mg Intramuscular Q1H PRN Dakota Perrin MD   2 mg at 08/25/21 1811         Labs     Recent Results (from the past 24 hour(s))   Basic Metabolic Panel    Collection Time: 09/01/21 12:41 PM   Result Value Ref Range    Fasting Status      Sodium 130 (L) 135 - 145 mmol/L    Potassium 4.1 3.4 - 5.1 mmol/L    Chloride 95 (L) 98 - 107 mmol/L    Carbon Dioxide 26 21 - 32 mmol/L    Anion Gap 13 10 - 20 mmol/L    Glucose 104 (H) 65 - 99 mg/dL    BUN 15 6 - 20 mg/dL    Creatinine 0.84 0.67 - 1.17 mg/dL    Glomerular Filtration Rate >90 >=60    BUN/ Creatinine Ratio 18 7 - 25    Calcium 9.5 8.4 - 10.2 mg/dL   Magnesium    Collection Time: 09/02/21  5:13 AM   Result Value Ref Range    Magnesium 1.8 1.7 - 2.4 mg/dL   Basic Metabolic Panel    Collection Time: 09/02/21  5:13 AM   Result Value Ref Range    Fasting Status      Sodium 133 (L) 135 - 145 mmol/L    Potassium 4.4 3.4 - 5.1 mmol/L    Chloride 97 (L) 98 - 107 mmol/L    Carbon Dioxide 22 21 - 32 mmol/L    Anion Gap 18 10 - 20 mmol/L    Glucose 98 65 - 99 mg/dL    BUN 15 6 - 20 mg/dL    Creatinine 0.80 0.67 - 1.17 mg/dL    Glomerular Filtration Rate >90 >=60    BUN/ Creatinine Ratio 19 7 - 25    Calcium 10.0 8.4 - 10.2 mg/dL        Imaging  CT ABDOMEN PELVIS W CONTRAST    Result Date: 8/23/2021  Narrative: EXAM: CT ABDOMEN PELVIS W CONTRAST CLINICAL INDICATION: Epigastric pain.  Nausea.  Emesis. TECHNIQUE: Multislice helical CT through the abdomen and pelvis performed following IV injection 85 mL Omnipaque 300  and without oral GI contrast. COMPARISON: CT examination abdomen and pelvis with IV contrast 07/14/2019. FINDINGS:  Mild uniform decrease attenuation hepatic parenchyma relation to the spleen.  There is hiatal hernia.  Small amount of air within distal esophagus and there is apparent mild mural thickening of the distal esophagus. Examination of the spleen, pancreas, adrenal glands, kidneys demonstrate no significant abnormality. There is moderate distention of the bladder and mild distention left and right renal collecting system bilaterally and symmetrically. Caliber of the bowel is within normal limits.  Appendix appears within normal limits. No evidence of free fluid or free air within the peritoneum.  Caliber of the abdominal aorta is within normal limits.  Mild mural calcification abdominal aorta. Mild diffuse osteopenia.  Decrease intervertebral disc height with vacuum disc phenomenon level L5-S1, chronic spondylolysis pars interarticularis of L5 bilaterally. There is disc bulge, endplate spur and 0.3 cm spondylolisthesis L5-S1, mild disc bulge and endplate spur L4-L5, L2-L3 and L1-L2.  There is slight peripheral reticular and groundglass attenuation posterior aspect of the left lung base.     Impression: 1.  There is small hiatal hernia with apparent mild mural thickening of the distal esophagus and small amount of air within the distal esophagus compatible with esophagitis and/or sequela of reflux; however I cannot entirely exclude esophageal neoplastic disease.  If warranted, endoscopy or esophagram is recommended to characterize this finding further. 2.  Prominent distention of the bladder with mild distention renal collecting systems bilaterally may be compatible with sequela of bladder outlet obstruction and/or reflux.  Recommend correlation with clinical assessment. 3.  Diffuse fatty infiltration of the liver. 4.  Mild dependent infiltrate, edema and/or atelectasis left lung base. 5.  Chronic  RUCHI Emmanuel spondylolysis pars interarticularis of L5 with grade 1 spondylolisthesis L5-S1. 6.  Degenerative lumbar spondylosis as described above. 7.  Mild mural calcification abdominal aorta compatible with atherosclerosis. 8.  Mild diffuse osteopenia compatible with osteoporosis. Electronically Signed by: FARAZ JOSHI M.D. Signed on: 8/23/2021 11:25 PM     CT HEAD WO CONTRAST    Result Date: 8/7/2021  Narrative: Exam:  CT head without contrast Indication:  Transient altered awareness Comparison:  CT brain 7/30/2021 Did a Tele-radiologist issued preliminary preliminary report? Yes     Impression: Findings and Impression:  No sign of intracranial hemorrhage, mass, or acute ischemia.  Chronic left basal ganglia lacunar infarcts. Mild leukoariaosis. Moderate cerebral atrophy.  Medially concave left lamina papyracea suggesting old orbital fracture. Calvarium and scalp normal. Visualized paranasal sinuses and mastoids clear. Electronically Signed by: ANDREIA IMRELES M.D. Signed on: 8/7/2021 6:36 AM     XR CHEST PA OR AP 1 VIEW    Result Date: 8/23/2021  Narrative: EXAM: XR CHEST PA OR AP 1 VIEW CLINICAL INDICATION: 50-year-old male with hematemesis, intoxication COMPARISON: 07/30/2021 FINDINGS: EKG leads overlie the chest.  Cardiomediastinal silhouette and pulmonary vasculature are within normal limits.  There is streaky bilateral basilar airspace disease.  Hemidiaphragms are distinct.  There is no pneumothorax. Subclavian atherosclerosis is suggested.  The visualized osseous structures are grossly unremarkable.     Impression:  Bilateral basilar airspace disease suggestive atelectasis/scarring. Otherwise unremarkable portable chest radiograph. FOR PHYSICIAN USE ONLY - Please note that this report was generated using voice recognition software.  If you require clarification or feel that there has been an error in this report please contact me through [employer].  Thank you very much for allowing me to participate in the care of  your patient. Electronically Signed by: ESTRELLITA MURRELL M.D. Signed on: 8/23/2021 9:04 PM          Assessment & Plan  Patient is a 50-year-old male with a past medical history significant for hypertension, CVA without residual deficit, alcohol use disorder, GERD and peptic ulcer disease who was brought to the ED by EMS with complaint of alcohol intoxication and coffee-ground emesis.     #Alcohol use disorder with alcohol intoxication and delirium- resolved  Valium weaned off  Continue with thiamine, folic acid, multivitamin tablet  Discussed with patient about the importance of complete alcohol cessation. He reports he is in agreement and plan to perform resources.   PT/OT eval recs , unable to provide due to substance abuse per CM     Acute hypo-osmolar hyponatremia due to SIADH  Renal on consult  Continue on fluid restriction  -Nephrology on consult.  improved with tolvaptan     #Chronic peptic ulcer disease and GERD  -Coffee-ground emesis. Esophagitis  -Per chart review patient's reported coffee-ground emesis on admission is concerning for upper GI bleeding including Amy-Garay tear or esophagitis.    -CT abdomen reviewed and reported above.  Noted significant for findings suggestive of esophagitis/GERD.    -Known history of chronic peptic ulcer disease and GERD. Hb stable.    -GI consulted, no plans for repeat endoscopic evaluation.  Hb has been stable with no decrease. -Continue with PPI PO     #Severe hypomagnesemia  resolved  -Likely related to alcohol abuse.      #Transamnitis-resolving:   -Likely due to alcohol abuse.   -Denied abdominal pain. Continue monitoring      #Alcoholic liver disease with pancytopenia  #Acute on chronic thrombocytopenia - resolved  Likely due to underlying alcoholic liver disease.   -Continue to monitor       DVT Prophylaxis  Current Active Medications for DVT Prophylaxis (From admission, onward)         Stop     heparin (porcine) injection 5,000 Units  5,000 Units,    Subcutaneous,   3 times per day         --              Discharge home today    Daniela Rouse MD  9/2/2021      This note was generated in part using \"Dragon\" voice recognition technology. The report was reviewed by this physician, but still may have unintentional errors due to inherent limitations of voice recognition technology.

## 2021-09-12 ENCOUNTER — EMERGENCY (EMERGENCY)
Facility: HOSPITAL | Age: 61
LOS: 1 days | Discharge: HOME | End: 2021-09-12
Attending: EMERGENCY MEDICINE
Payer: COMMERCIAL

## 2021-09-12 VITALS
HEIGHT: 68 IN | WEIGHT: 265 LBS | OXYGEN SATURATION: 99 % | RESPIRATION RATE: 16 BRPM | DIASTOLIC BLOOD PRESSURE: 75 MMHG | TEMPERATURE: 98 F | HEART RATE: 139 BPM | SYSTOLIC BLOOD PRESSURE: 123 MMHG

## 2021-09-12 VITALS — HEART RATE: 76 BPM

## 2021-09-12 DIAGNOSIS — Z98.890 OTHER SPECIFIED POSTPROCEDURAL STATES: Chronic | ICD-10-CM

## 2021-09-12 LAB
ALBUMIN SERPL ELPH-MCNC: 4 G/DL — SIGNIFICANT CHANGE UP (ref 3.3–5)
ALP SERPL-CCNC: 75 U/L — SIGNIFICANT CHANGE UP (ref 40–120)
ALT FLD-CCNC: 34 U/L — SIGNIFICANT CHANGE UP (ref 10–45)
ANION GAP SERPL CALC-SCNC: 14 MMOL/L — SIGNIFICANT CHANGE UP (ref 5–17)
AST SERPL-CCNC: 23 U/L — SIGNIFICANT CHANGE UP (ref 10–40)
BASOPHILS # BLD AUTO: 0.07 K/UL — SIGNIFICANT CHANGE UP (ref 0–0.2)
BASOPHILS NFR BLD AUTO: 0.9 % — SIGNIFICANT CHANGE UP (ref 0–2)
BILIRUB SERPL-MCNC: 0.2 MG/DL — SIGNIFICANT CHANGE UP (ref 0.2–1.2)
BUN SERPL-MCNC: 15 MG/DL — SIGNIFICANT CHANGE UP (ref 7–23)
CALCIUM SERPL-MCNC: 9.6 MG/DL — SIGNIFICANT CHANGE UP (ref 8.4–10.5)
CHLORIDE SERPL-SCNC: 107 MMOL/L — SIGNIFICANT CHANGE UP (ref 96–108)
CO2 SERPL-SCNC: 20 MMOL/L — LOW (ref 22–31)
CREAT SERPL-MCNC: 0.88 MG/DL — SIGNIFICANT CHANGE UP (ref 0.5–1.3)
EOSINOPHIL # BLD AUTO: 0.31 K/UL — SIGNIFICANT CHANGE UP (ref 0–0.5)
EOSINOPHIL NFR BLD AUTO: 4 % — SIGNIFICANT CHANGE UP (ref 0–6)
GLUCOSE SERPL-MCNC: 94 MG/DL — SIGNIFICANT CHANGE UP (ref 70–99)
HCT VFR BLD CALC: 41.4 % — SIGNIFICANT CHANGE UP (ref 34.5–45)
HGB BLD-MCNC: 13.5 G/DL — SIGNIFICANT CHANGE UP (ref 11.5–15.5)
IMM GRANULOCYTES NFR BLD AUTO: 0.1 % — SIGNIFICANT CHANGE UP (ref 0–1.5)
LYMPHOCYTES # BLD AUTO: 2.04 K/UL — SIGNIFICANT CHANGE UP (ref 1–3.3)
LYMPHOCYTES # BLD AUTO: 26.2 % — SIGNIFICANT CHANGE UP (ref 13–44)
MAGNESIUM SERPL-MCNC: 2.2 MG/DL — SIGNIFICANT CHANGE UP (ref 1.6–2.6)
MCHC RBC-ENTMCNC: 30.3 PG — SIGNIFICANT CHANGE UP (ref 27–34)
MCHC RBC-ENTMCNC: 32.6 GM/DL — SIGNIFICANT CHANGE UP (ref 32–36)
MCV RBC AUTO: 93 FL — SIGNIFICANT CHANGE UP (ref 80–100)
MONOCYTES # BLD AUTO: 0.58 K/UL — SIGNIFICANT CHANGE UP (ref 0–0.9)
MONOCYTES NFR BLD AUTO: 7.5 % — SIGNIFICANT CHANGE UP (ref 2–14)
NEUTROPHILS # BLD AUTO: 4.77 K/UL — SIGNIFICANT CHANGE UP (ref 1.8–7.4)
NEUTROPHILS NFR BLD AUTO: 61.3 % — SIGNIFICANT CHANGE UP (ref 43–77)
NRBC # BLD: 0 /100 WBCS — SIGNIFICANT CHANGE UP (ref 0–0)
PHOSPHATE SERPL-MCNC: 2.1 MG/DL — LOW (ref 2.5–4.5)
PLATELET # BLD AUTO: 256 K/UL — SIGNIFICANT CHANGE UP (ref 150–400)
POTASSIUM SERPL-MCNC: 3.9 MMOL/L — SIGNIFICANT CHANGE UP (ref 3.5–5.3)
POTASSIUM SERPL-SCNC: 3.9 MMOL/L — SIGNIFICANT CHANGE UP (ref 3.5–5.3)
PROT SERPL-MCNC: 7.3 G/DL — SIGNIFICANT CHANGE UP (ref 6–8.3)
RBC # BLD: 4.45 M/UL — SIGNIFICANT CHANGE UP (ref 3.8–5.2)
RBC # FLD: 13.3 % — SIGNIFICANT CHANGE UP (ref 10.3–14.5)
SODIUM SERPL-SCNC: 141 MMOL/L — SIGNIFICANT CHANGE UP (ref 135–145)
TSH SERPL-MCNC: 4.4 UIU/ML — HIGH (ref 0.27–4.2)
WBC # BLD: 7.78 K/UL — SIGNIFICANT CHANGE UP (ref 3.8–10.5)
WBC # FLD AUTO: 7.78 K/UL — SIGNIFICANT CHANGE UP (ref 3.8–10.5)

## 2021-09-12 PROCEDURE — 93005 ELECTROCARDIOGRAM TRACING: CPT

## 2021-09-12 PROCEDURE — 93010 ELECTROCARDIOGRAM REPORT: CPT

## 2021-09-12 PROCEDURE — 71045 X-RAY EXAM CHEST 1 VIEW: CPT | Mod: 26

## 2021-09-12 PROCEDURE — 99284 EMERGENCY DEPT VISIT MOD MDM: CPT | Mod: 25

## 2021-09-12 PROCEDURE — 99284 EMERGENCY DEPT VISIT MOD MDM: CPT

## 2021-09-12 PROCEDURE — 71045 X-RAY EXAM CHEST 1 VIEW: CPT

## 2021-09-12 PROCEDURE — 96374 THER/PROPH/DIAG INJ IV PUSH: CPT

## 2021-09-12 RX ORDER — METOPROLOL TARTRATE 50 MG
5 TABLET ORAL ONCE
Refills: 0 | Status: COMPLETED | OUTPATIENT
Start: 2021-09-12 | End: 2021-09-12

## 2021-09-12 RX ORDER — SODIUM,POTASSIUM PHOSPHATES 278-250MG
1 POWDER IN PACKET (EA) ORAL ONCE
Refills: 0 | Status: COMPLETED | OUTPATIENT
Start: 2021-09-12 | End: 2021-09-12

## 2021-09-12 RX ORDER — METOPROLOL TARTRATE 50 MG
5 TABLET ORAL ONCE
Refills: 0 | Status: DISCONTINUED | OUTPATIENT
Start: 2021-09-12 | End: 2021-09-15

## 2021-09-12 RX ORDER — SODIUM CHLORIDE 9 MG/ML
1000 INJECTION, SOLUTION INTRAVENOUS ONCE
Refills: 0 | Status: COMPLETED | OUTPATIENT
Start: 2021-09-12 | End: 2021-09-12

## 2021-09-12 RX ADMIN — Medication 5 MILLIGRAM(S): at 08:03

## 2021-09-12 RX ADMIN — SODIUM CHLORIDE 1000 MILLILITER(S): 9 INJECTION, SOLUTION INTRAVENOUS at 08:07

## 2021-09-12 RX ADMIN — Medication 1 TABLET(S): at 09:37

## 2021-09-12 NOTE — ED ADULT NURSE NOTE - OBJECTIVE STATEMENT
62 yo F, h/o recent dx of Afib one year ago after similar presentation with Afib RVR, now on Lopressor 25 mg daily, p/w complaints of palpitations and rapidly beating HR since yesterday evening. She states that she began feeling anxious yesterday after watching a few hours of 9/11 [facility] service coverage. She also notes drinking 4 diet jared yesterday and thinks the caffeine may have contributed. She says the rapid HR intermittently "feels like a hammer pounding" in her chest. Otherwise, she denies any chest pain, SOB, cough, recent fevers, chills, abdominal pain, nausea, vomiting, melena, hematochezia, dysuria, hematuria, urinary frequency, lightheadedness, dizziness, leg pain, leg swelling or any other complaints at this time.  IVL placed by EMS and bloods sent as ordered     HIV:

## 2021-09-12 NOTE — ED PROVIDER NOTE - ATTENDING CONTRIBUTION TO CARE
attending Abraham: 61yF h/o known Afib on metoprolol succinate 25mg daily, no AC p/w palpitations and found to be in rapid Afib to 130s. No h/o DVT or PE. Reports having increased caffeine use in the last 24 hours as well as anxiety related to work and 9/11 anniversary. Denies infectious symptoms. Tachycardic on exam, otherwise well appearing. EKG showing rapid A fib. Will place on tele, labs eval lytes, IVF, rate control, reach out to private cardiologist, reassess

## 2021-09-12 NOTE — ED PROVIDER NOTE - NSFOLLOWUPINSTRUCTIONS_ED_ALL_ED_FT
You were seen in the Emergency Department for palpitations. This was related to your underlying condition of atrial fibrillation. This could have been triggered by stress or excessive caffeine intake. Your heart rate improved with some additional metoprolol in the ED. Your bloodwork showed no significant abnormalities and you were discharge with outpatient follow up with Dr. Keenan.      1) Advance activity as tolerated.   2) Continue all previously prescribed medications as directed.    3) Follow up with your cardiologist, Dr. Keenan, in 24-48 hours - take copies of your results.    4) Return to the Emergency Department for worsening or persistent symptoms, and/or ANY NEW OR CONCERNING SYMPTOMS., including but not limited to:  chest pain, shortness of breath, palpitations, abdominal pain, nausea, vomiting, diarrhea, fevers, chills, rapid HR OR ANY OTHER CONCERNING SYMPTOMS.

## 2021-09-12 NOTE — ED PROVIDER NOTE - PATIENT PORTAL LINK FT
You can access the FollowMyHealth Patient Portal offered by [facility] by registering at the following website: [email or web address]. By joining [email or web address] FollowMyHealth portal, you will also be able to view your health information using other applications (apps) compatible with our system.

## 2021-09-12 NOTE — ED PROVIDER NOTE - OBJECTIVE STATEMENT
60 yo F, h/o recent dx of Afib one year ago after similar presentation with afib RVR, now on metop succ 25 mg daily, p/w complaints of palpitations and rapidly beating HR since yesterday evening. She states that she began feeling anxious yesterday after watching a few hours of 9/11 [employer] service coverage. She also notes drinking 4 diet jared yesterday and thinks the caffeine may have contributed. She says the rapid HR intermittently "feels like a hammer pounding" in her chest. Otherwise, she denies any chest pain, SOB, cough, recent fevers, chills, abdominal pain, nausea, vomiting, melena, hematochezia, dysuria, hematuria, urinary frequency, lightheadedness, dizziness, leg pain, leg swelling or any other complaints at this time.

## 2021-09-12 NOTE — ED PROVIDER NOTE - PHYSICAL EXAMINATION
PHYSICAL EXAM:  GENERAL: Sitting comfortable in bed, in no acute distress  HENMT: Atraumatic, dry mucous membranes, no oropharyngeal exudates or vesicles, uvula is midline EYES: Clear bilaterally, PERRL, EOMs intact b/l  HEART: irreg x2, S1/S2, no murmur/gallops/rubs  RESPIRATORY: Clear to auscultation bilaterally, no wheezes/rhonchi/rales  ABDOMEN: +BS, soft, nontender, nondistended  EXTREMITIES: No lower extremity edema, +2 radial pulses b/l  NEURO:  A&Ox4, no focal motor deficits or sensory deficits   Heme/LYMPH: No ecchymosis or bruising, no anterior/posterior cervical or supraclavicular LAD  SKIN:  Skin normal color for race, warm, dry and intact. No evidence of rash.

## 2021-09-12 NOTE — ED PROVIDER NOTE - PROGRESS NOTE DETAILS
Jose Ashby MD (PGY-2): Pt now in NSR s/p iv lopresso 5 mg x2 doses. Bloodwork grossly normal other than hypophosphatemia--now repleted. Pt reports significant improvement, "I feel like myself again." EKG now with NSR at 70s bpm. Will d/c with outpatient follow up with Dr. Keenan as above. Jose Ashby MD (PGY-2): Case discussed with Dr. Keenan, pt's outpt cardiologist, who states that patient has known pAFib, recent negative stress test in Jan '21 and grossly normal echo in 12/20. Agrees with plan for rate control and d/c with close follow up in his office this week.

## 2021-09-12 NOTE — ED ADULT NURSE REASSESSMENT NOTE - NS ED NURSE REASSESS COMMENT FT1
1111 pt heart rate in the high 60s 70 rate controlled Pt ready  for  discharge and d/c by rn Pt verbalized understanding of d/c instructions Mila
0907 Pt heartrate 70s after IV Lopressor given will continue to monitor Mpuleorn

## 2021-09-12 NOTE — ED PROVIDER NOTE - CLINICAL SUMMARY MEDICAL DECISION MAKING FREE TEXT BOX
Jose Ashby MD (PGY-2): 60 yo F known h/o Afib, on metoprolol sux 25 daily, no AC, no history of clots/cvas/CAD, p/w palpitations and rapid HR since yesterday evening. Otherwise, no CP, no SOB, no lightheadedness/dizziness. Presents tachycardic to 140s in afib with RVR, BP stable at 130/100.   c/f afib with rvr i/s/o known afib--possible triggers anxiety v. increased caffeine intake v. dehydration, no CP--suspicion for ischemia, no ischemic changes on ECG, no recent fevers--low suspicion for infectious trigger. Will obtain cbc/cmp/mg/phos/cxr/ecg and treat with fluids and IV metoprolol for rate control.

## 2021-09-13 NOTE — ED POST DISCHARGE NOTE - DETAILS
- Shannen Aguilar PA-C Pt called back, discussed results, advised PCP f/u for repeat labs. - Shannen Aguilar PA-C

## 2022-09-27 NOTE — HISTORY OF PRESENT ILLNESS
Peripheral Block    Patient location during procedure: OR  Reason for block: post-op pain management and at surgeon's request  Start time: 9/27/2022 12:36 PM  End time: 9/27/2022 12:39 PM  Staffing  Anesthesiologist: Han Blanco MD  Preanesthetic Checklist  Completed: patient identified, IV checked, site marked, risks and benefits discussed, surgical/procedural consents, equipment checked, pre-op evaluation, timeout performed, anesthesia consent given, oxygen available, monitors applied/VS acknowledged, fire risk safety assessment completed and verbalized and blood product R/B/A discussed and consented  Peripheral Block   Patient position: supine  Prep: ChloraPrep  Provider prep: mask and sterile gloves  Block type: Femoral lateral cutaneous  Laterality: left  Injection technique: single-shot  Guidance: other  Infiltration strength: 0.2 %  Dose: 10 mL    Needle   Needle gauge: 20 G  Needle localization: anatomical landmarks  Needle insertion depth: 1.2 cm  Test dose: negative  Needle length: 10 cm  Assessment   Injection assessment: negative aspiration for heme, no paresthesia on injection and no intravascular symptoms  Paresthesia pain: none  Slow fractionated injection: yes  Hemodynamics: stableno  Outcomes: uncomplicated and patient tolerated procedure well [de-identified] : Ms. SHARON PEREZ is a 59 year female who presents to office complaining of right hip pain suspected to be due to bursitis. She has had episodes of hip bursitis on and off over the last few years. Pain is more problematic with lying on the affected hip and with prolonged walking.\par She does not treat her pain with any conservative treatments. Has never received an injection of cortisone. Has tried PT in the past which only worsened her pain.\par Denies any other associated symptoms with her hip pain.\par All review of systems not previously stated as positive are negative.

## 2023-03-02 ENCOUNTER — NON-APPOINTMENT (OUTPATIENT)
Age: 63
End: 2023-03-02

## 2023-08-08 NOTE — ED ADULT TRIAGE NOTE - CHIEF COMPLAINT QUOTE
----- Message from Fara Bridges MD sent at 8/7/2023 10:47 PM CDT -----  Call pt with results  Stable labs CPM  Add hg a1c as BS was borderline high      palpitations

## 2024-06-17 NOTE — ED PROVIDER NOTE - CARE PROVIDER_API CALL
normal mood with appropriate affect
Luis Manuel Keenan (MD)  Cardiovascular Disease; Internal Medicine  [address] [facility] [address], [address]  [place], [place] [id number]  Phone: [phone]  Fax: [phone]  Established Patient  Follow Up Time: 1-3 Days

## 2024-10-10 NOTE — ED ADULT NURSE NOTE - BREATH SOUNDS, RIGHT
"Subjective:      Patient ID: Catina Myers is a 33 y.o. female.    Vitals:  height is 5' 4" (1.626 m) and weight is 112.5 kg (248 lb 0.3 oz). Her oral temperature is 98.2 °F (36.8 °C). Her blood pressure is 136/94 (abnormal) and her pulse is 70. Her respiration is 19 and oxygen saturation is 98%.     Chief Complaint: Headache    33-year-old female here today for feeling stressed and anxious for the past week.  She began having headaches 2 days ago.  She reports associated fatigue, restlessness, difficulty sleeping, photophobia, blurred vision.  She reports history of anxiety and migraines.  Symptoms similar to previous episodes of migraines.  When her anxiety gets bad, she experiences migraines.  She normally takes Wellbutrin for anxiety, but has been out of that for at least a month or more.  She was unable to follow up with her PCP and was told to come to urgent care.  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  She is also feeling stress from work as well as taking care of her family.  She is also requesting refill of her albuterol inhaler.  She has taken Fioricet in the past for migraines, but does not like how it makes her feel "jittery." She denies fever, chills, LOC, numbness or tingling, slurred speech, focal weakness, chest pain, SOB, nausea or vomiting, abdominal pain, dizziness.    Headache   This is a new problem. Episode onset: 2 days ago. The problem occurs constantly. The problem has been unchanged. The pain is located in the Frontal region. The pain does not radiate. The pain quality is not similar to prior headaches. The quality of the pain is described as aching. The pain is at a severity of 6/10. The pain is moderate. Associated symptoms include blurred vision, insomnia, photophobia and tingling. Pertinent negatives include no abdominal pain, coughing, dizziness, ear pain, eye pain, eye redness, fever, nausea, neck pain, sore throat " or vomiting. Nothing aggravates the symptoms. She has tried NSAIDs for the symptoms. The treatment provided no relief.     Constitution: Positive for fatigue. Negative for chills and fever.   HENT:  Negative for ear pain, congestion and sore throat.    Neck: Negative for neck pain.   Cardiovascular:  Negative for chest pain.   Eyes:  Positive for photophobia and blurred vision. Negative for eye discharge, eye itching, eye pain, eye redness, vision loss and double vision.   Respiratory:  Negative for cough and shortness of breath.    Gastrointestinal:  Negative for abdominal pain, nausea, vomiting and diarrhea.   Genitourinary:  Negative for dysuria.   Musculoskeletal:  Negative for muscle ache.   Skin:  Negative for rash.   Neurological:  Positive for headaches. Negative for dizziness.   Psychiatric/Behavioral:  The patient has insomnia.       Objective:     Physical Exam   Constitutional: She is oriented to person, place, and time. She appears well-developed.      Comments:Tearful during exam     HENT:   Head: Normocephalic and atraumatic.   Ears:   Right Ear: Tympanic membrane, external ear and ear canal normal.   Left Ear: Tympanic membrane, external ear and ear canal normal.   Nose: Nose normal.   Mouth/Throat: Oropharynx is clear and moist. Mucous membranes are moist. Oropharynx is clear.   Eyes: Conjunctivae, EOM and lids are normal. Pupils are equal, round, and reactive to light. Extraocular movement intact   Neck: Trachea normal and phonation normal. Neck supple.   Cardiovascular: Normal rate, regular rhythm, normal heart sounds and normal pulses.   Pulmonary/Chest: Effort normal and breath sounds normal. No respiratory distress.   Musculoskeletal: Normal range of motion.         General: Normal range of motion.   Neurological: no focal deficit. She is alert and oriented to person, place, and time. She has normal motor skills, normal sensation and intact cranial nerves (2-12). No cranial nerve deficit. Gait  and coordination normal. GCS eye subscore is 4. GCS verbal subscore is 5. GCS motor subscore is 6.   Skin: Skin is warm, dry and intact. Capillary refill takes less than 2 seconds.   Psychiatric: Her speech is normal and behavior is normal. Judgment and thought content normal. Her mood appears anxious.   Nursing note and vitals reviewed.      Assessment:     1. Anxiety    2. Medication refill    3. Migraine without status migrainosus, not intractable, unspecified migraine type        Plan:       Anxiety  -     buPROPion (WELLBUTRIN) 100 MG tablet; Take 1 tablet (100 mg total) by mouth 2 (two) times daily.  Dispense: 60 tablet; Refill: 0  -     hydrOXYzine HCL (ATARAX) 25 MG tablet; Take 1 tablet (25 mg total) by mouth nightly as needed for Anxiety.  Dispense: 20 tablet; Refill: 0    Medication refill  -     buPROPion (WELLBUTRIN) 100 MG tablet; Take 1 tablet (100 mg total) by mouth 2 (two) times daily.  Dispense: 60 tablet; Refill: 0  -     albuterol (VENTOLIN HFA) 90 mcg/actuation inhaler; Inhale 2 puffs into the lungs every 6 (six) hours as needed for Wheezing. Rescue  Dispense: 18 g; Refill: 0    Migraine without status migrainosus, not intractable, unspecified migraine type  -     ketorolac injection 30 mg  -     dexAMETHasone injection 10 mg  -     ibuprofen (ADVIL,MOTRIN) 600 MG tablet; Take 1 tablet (600 mg total) by mouth every 6 (six) hours as needed for Pain.  Dispense: 20 tablet; Refill: 0    33-year-old female with history of anxiety and migraines presents for feelings of increased stress and anxiety for the past week.  Headaches began 2 days ago.  She reports associated blurry vision, photophobia, fatigue, restlessness, difficulty sleeping with her migraines.  She states that this is consistent with previous migraines.  She has been out of Wellbutrin and requesting refill.  Also requesting refill for albuterol.  Blood pressure mildly elevated 136/94. Other vital signs are within normal limits.  On exam,  patient is nontoxic and afebrile.  Lungs CTAB.  RRR.  PERRLA, EOMI.  Neurologically intact without focal deficits.  Anxious mood and tearful during exam.  Will refill Wellbutrin and albuterol for patient.  Hydroxyzine as needed at night to help with sleep.  Toradol and dexamethasone IM in clinic for migraine. Ibuprofen sent to pharmacy for headaches.  Follow up with PCP.  Return precautions.  ED precautions.           Patient Instructions   Take ibuprofen as needed for headaches.  Because you already received a Toradol injection today, do not take ibuprofen until 8 hours after your injection.  Take Wellbutrin as prescribed for anxiety.  You can take hydroxyzine as needed nightly to aid with sleep.  Your albuterol inhaler has been refilled.    - Follow up with your PCP or specialty clinic as directed in the next 1-2 weeks if not improved or as needed.  You can call [phone] to schedule an appointment with the appropriate provider.    - Go to the ER or seek medical attention immediately if you develop new or worsening symptoms.    - You must understand that you have received an Urgent Care treatment only and that you may be released before all of your medical problems are known or treated.   - You, the patient, will arrange for follow up care as instructed.   - If your condition worsens or fails to improve we recommend that you receive another evaluation at the ER immediately or contact your PCP to discuss your concerns or return here.        clear

## 2025-01-27 NOTE — ED ADULT TRIAGE NOTE - IDEAL BODY WEIGHT(KG)
Patients potassium is now 4.8.  Renin has increase from 0.1 to 0.6.  I would recommend we increase aldactone to 37.5 mg daily recheck a renin (goal is 1.0) and BMP in 2 weeks.  Please inquire if blood pressure is improving.      Thanks,     Joe 
65

## 2025-06-27 NOTE — ED POST DISCHARGE NOTE - RESULT SUMMARY
elevated TSH Detail Level: Detailed Depth Of Biopsy: dermis Was A Bandage Applied: Yes Size Of Lesion In Cm: 0.5 X Size Of Lesion In Cm: 0 Biopsy Type: H and E Biopsy Method: double edge Personna blade Anesthesia Type: 1% lidocaine without epinephrine Hemostasis: Electrocautery and Aluminum Chloride Wound Care: Petrolatum Dressing: bandage Destruction After The Procedure: No Type Of Destruction Used: Curettage Curettage Text: The wound bed was treated with curettage after the biopsy was performed. Cryotherapy Text: The wound bed was treated with cryotherapy after the biopsy was performed. Electrodesiccation Text: The wound bed was treated with electrodesiccation after the biopsy was performed. Electrodesiccation And Curettage Text: The wound bed was treated with electrodesiccation and curettage after the biopsy was performed. Silver Nitrate Text: The wound bed was treated with silver nitrate after the biopsy was performed. Lab: [id number] Medical Necessity Information: It is in your best interest to select a reason for this procedure from the list below. All of these items fulfill various CMS LCD requirements except the new and changing color options. Consent was obtained and risks, benefits, and alternatives were reviewed. Post-Care Instructions: Patient is to keep the biopsy covered with Vaseline/Aquaphor and cover with bandage daily until healed. Call the office if any concerns about healing process. Notification Instructions: Patient will be notified of biopsy results. Billing Type: Third-Party Bill Information: Selecting Yes will display possible errors in your note based on the variables you have selected. This validation is only offered as a suggestion for you. PLEASE NOTE THAT THE VALIDATION TEXT WILL BE REMOVED WHEN YOU FINALIZE YOUR NOTE. IF YOU WANT TO FAX A PRELIMINARY NOTE YOU WILL NEED TO TOGGLE THIS TO 'NO' IF YOU DO NOT WANT IT IN YOUR FAXED NOTE.